# Patient Record
(demographics unavailable — no encounter records)

---

## 2025-03-24 NOTE — HISTORY OF PRESENT ILLNESS
[de-identified] : The upper endoscopy performed at the [facility] [place] [facility] suite on January 9, 2024 revealed mild diffuse gastritis. The gastric pathology performed on January 9, 2024 revealed esophageal mucosa with squamous mucosa with mild reflux changes with columnar glandular mucosa with mild to moderate chronic inflammation and minute detached fragments with focal goblet cell metaplasia that was negative for dysplasia with a PASF stain that is negative for fungal microorganisms (esophagus) suggestive of short segment Freeman's esophagus, gastric antral mucosa with mild chronic antral gastritis with reactive changes that was negative for Helicobacter pylori (antrum), gastric body mucosa with moderate chronic gastritis with lymphoid aggregates and focal intestinal goblet cell metaplasia that was negative for dysplasia that was positive for Helicobacter pylori (body of the stomach) and duodenal mucosa with preserved villous architecture and no significant histopathologic changes that was negative for increased intraepithelial lymphocytes (duodenum).   The upper endoscopy performed by Dr. Jameel Alegria on August 5, 2021 revealed LA grade a esophagitis with no bleeding found approximately 39 cm from the incisors, localized moderate inflammation characterized by erythema, friability and granularity found in the gastric antrum, diffuse moderate inflammation characterized by erythema, friability and granularity found in the gastric body and a normal second portion of the duodenum.  The gastric pathology performed on August 5, 2021 revealed duodenal mucosa with patchy mild increase in intraepithelial lymphocytes and preserved villous architecture.  The findings are nonspecific and can be seen in early celiac disease, H. pylori gastritis, drugs, possible nonsteroidal anti-inflammatory drugs, gluten food allergies, immune mediated diseases, Crohn's disease and morbid obesity.  (Duodenum).,  Gastric antral mucosa with chronic inactive nonspecific gastritis with gastric antral gland mucosa with reactive changes that was negative for Helicobacter pylori (antrum), gastric body mucosa with neuroendocrine cell hyperplasia and atrophy of the oxyntic mucosa present.  The overall findings are suggestive of autoimmune atrophic gastritis, inactive chronic nonspecific gastritis and intestinal metaplasia that was negative for dysplasia that was negative for Helicobacter pylori (body of the stomach) and gastroesophageal junction mucosa with suggestion of reflux esophagitis with no columnar mucosa identified (GE junction).   [FreeTextEntry1] :  The colonoscopy performed by Dr. Jameel Alegria on August 5, 2021 revealed preparation of colon was fair, internal hemorrhoids, the examination was otherwise normal.

## 2025-03-24 NOTE — ASSESSMENT
[FreeTextEntry1] : Abdominal Pain: The patient complains of abdominal pain. The patient is to avoid nonsteroidal anti-inflammatory drugs and aspirin.  I recommend a low FOD-MAP diet.  I recommend a trial of Dicyclomine 10 mg tablet PO 3 times a day PRN for the abdominal pain. Dyspepsia: The patient complains of dyspeptic symptoms.  The patient was advised to continue to abide by an anti-gas (low FOD-MAP) diet.  The patient was previously given a pamphlet for anti-gas (low FOD-MAP).  The patient and I reviewed the anti-gas (low FOD-MAP)diet at length again. The patient is to continue on a trial of Simethicone one tablet 4 times a day p.r.n. abdominal pain and gas. GERD: The patient was advised to avoid late-night meals and dietary indiscretions.  The patient was advised to avoid fried and fatty foods.  The patient was advised to abide by an anti-GERD diet. The patient was given a pamphlet for anti-GERD.  The patient and I reviewed the anti-GERD diet at length. I recommend a trial of Sucralfate 1 gram 4 times a day x 3 months for the symptoms. Nausea: The patient complains of nausea. If the symptoms of nausea persists, the patient may require a trial of Zofran 4 mg twice a day.  Alternating Diarrhea/Constipation: The patient complains of alternating diarrhea/constipation.  I recommend a low residue diet. The patient is to avoid fiber supplementation. The patient is to consider starting a trial of a probiotic such as Align once a day.  If the symptoms persist, the patient may require sending stool studies for C+S, O+P x3, and C. difficile to assess for an infectious etiology of the diarrhea.  The patient agreed and will follow-up to reassess the symptoms. I recommend a stool guaiac to assess for occult blood in the stool. I recommend a repeat H. pylori breath test to reassess the symptoms. History of Freeman's Esophagus: The patient has a history of Freeman's esophagus noted on prior upper endoscopy. The patient and I had a long discussion regarding the risk of Freeman's esophagus progressing to esophageal cancer. The patient was told of the importance of acid suppression. I recommend a trial of Omeprazole 40mg once a day for the Freeman's esophagus. I recommend a repeat upper endoscopy in 3 years to reassess for Freeman's esophagus and dysplasia unless symptomatic. The patient agreed and will follow up for the procedure. Reflux Esophagitis: The patient had reflux esophagitis noted on prior upper endoscopy. The patient was advised to avoid late-night meals and dietary indiscretions. The patient was advised to avoid fried and fatty foods. The patient was advised to abide by an anti-GERD diet. The patient was given a pamphlet for anti-GERD. The patient and I reviewed the anti-GERD diet at length. I recommend a trial of Omeprazole 40 mg once a day x 3 months for the reflux esophagitis. Gastritis: The patient has a history of gastritis noted on prior upper endoscopy. The patient is to avoid nonsteroidal anti-inflammatory drugs and aspirin. I recommend a trial of Omeprazole 40 mg once a day for 3 months for the symptoms. H. pylori Gastritis: The patient was found to have H. pylori gastritis on prior upper endoscopy. The patient completed a trial of Clarithromycin 500 mg 2 times a day, Amoxicillin 500mg 2 tablets twice a day and Omeprazole 40 mg twice a day for 14 days for H. pylori eradication. The H. pylori breath test performed on 2023 was negative. History of Colonic Polyps: The patient has a history of colonic polyps.  I recommend a repeat colonoscopy to reassess for colonic polyps.  The patient was told of the risks and benefits of the procedure.  The patient was told of the risks of perforation, emergency surgery, bleeding, infections and missed lesions.  The patient is to be on a clear liquid diet the entire day prior to the procedure. The patient is to complete the entire prescribed bowel prep the day prior to the procedure as directed. The patient is told not to drive, drink alcohol, use recreational drugs, exercise, or work the day of the procedure.  The patient was told of the need for an escort to accompany the patient home after the procedure. The patient is aware that the procedure may be cancelled if they fail to follow the directions.  The patient agreed and will schedule for the procedure. The patient is to be n.p.o. after midnight and bowel prep was given.  The patient is to return for the procedure.  Colonoscopy: I recommend a colonoscopy to assess the symptoms and for colonic polyps.  The patient was told of the risks and benefits of the procedure.  The patient was told of the risks of perforation, emergency surgery, bleeding, infections and missed lesions.  The patient is to be on a clear liquid diet the entire day prior to the procedure. The patient is to complete the entire prescribed bowel prep the day prior to the procedure as directed. The patient is told not to drive, drink alcohol, use recreational drugs, exercise, or work the day of the procedure.  The patient was told of the need for an escort to accompany the patient home after the procedure. The patient is aware that the procedure may be cancelled if they fail to follow the directions.  The patient agreed and will schedule for the procedure. The patient is to return for the procedure. Prior Endoscopic Procedures: The patient had a prior upper endoscopy and colonoscopy performed by another gastroenterologist. The upper endoscopy performed by Dr. Jameel Alegria on 2021 revealed LA grade a esophagitis with no bleeding found approximately 39 cm from the incisors, localized moderate inflammation characterized by erythema, friability and granularity found in the gastric antrum, diffuse moderate inflammation characterized by erythema, friability and granularity found in the gastric body and a normal second portion of the duodenum. The gastric pathology performed on 2021 revealed duodenal mucosa with patchy mild increase in intraepithelial lymphocytes and preserved villous architecture. The findings are nonspecific and can be seen in early celiac disease, H. pylori gastritis, drugs, possible nonsteroidal anti-inflammatory drugs, gluten food allergies, immune mediated diseases, Crohn's disease and morbid obesity. (Duodenum)., Gastric antral mucosa with chronic inactive nonspecific gastritis with gastric antral gland mucosa with reactive changes that was negative for Helicobacter pylori (antrum), gastric body mucosa with neuroendocrine cell hyperplasia and atrophy of the oxyntic mucosa present. The overall findings are suggestive of autoimmune atrophic gastritis, inactive chronic nonspecific gastritis and intestinal metaplasia that was negative for dysplasia that was negative for Helicobacter pylori (body of the stomach) and gastroesophageal junction mucosa with suggestion of reflux esophagitis with no columnar mucosa identified (GE junction). The colonoscopy performed by Dr. Jmaeel Alegria on 2021 revealed preparation of colon was fair, internal hemorrhoids, the examination was otherwise normal. Family History of GI Malignancy: The patient admits to a family history of GI problems. The patient's sister had a history of colon cancer and father  of liver cirrhosis. Follow-up: The patient is to follow-up in the office in 1 month to reassess the symptoms. The patient was told to call the office if any further problems.